# Patient Record
Sex: FEMALE | URBAN - METROPOLITAN AREA
[De-identification: names, ages, dates, MRNs, and addresses within clinical notes are randomized per-mention and may not be internally consistent; named-entity substitution may affect disease eponyms.]

---

## 2022-07-25 ENCOUNTER — TELEPHONE ENCOUNTER (OUTPATIENT)
Dept: URBAN - METROPOLITAN AREA CLINIC 78 | Facility: CLINIC | Age: 47
End: 2022-07-25

## 2022-07-28 ENCOUNTER — OFFICE VISIT (OUTPATIENT)
Dept: URBAN - METROPOLITAN AREA CLINIC 33 | Facility: CLINIC | Age: 47
End: 2022-07-28

## 2022-07-28 NOTE — HPI-TODAY'S VISIT:
Patient presents today for a consultation for colorectal cancer screening. Admits/denies having a colonoscopy or sigmoidoscopy before. Currently admits __ bowel movements ___. Stools are __. Patient denies/admits heartburn. Admits/denies a family history of Colon/rectal cancer or polyps

## 2022-08-11 ENCOUNTER — OFFICE VISIT (OUTPATIENT)
Dept: URBAN - METROPOLITAN AREA CLINIC 33 | Facility: CLINIC | Age: 47
End: 2022-08-11
Payer: COMMERCIAL

## 2022-08-11 ENCOUNTER — LAB OUTSIDE AN ENCOUNTER (OUTPATIENT)
Dept: URBAN - METROPOLITAN AREA CLINIC 33 | Facility: CLINIC | Age: 47
End: 2022-08-11

## 2022-08-11 VITALS
SYSTOLIC BLOOD PRESSURE: 158 MMHG | HEIGHT: 61 IN | DIASTOLIC BLOOD PRESSURE: 96 MMHG | OXYGEN SATURATION: 97 % | HEART RATE: 77 BPM | BODY MASS INDEX: 44.37 KG/M2 | WEIGHT: 235 LBS

## 2022-08-11 DIAGNOSIS — Z12.11 COLON CANCER SCREENING: ICD-10-CM

## 2022-08-11 PROCEDURE — 99202 OFFICE O/P NEW SF 15 MIN: CPT | Performed by: INTERNAL MEDICINE

## 2022-08-11 PROCEDURE — 99242 OFF/OP CONSLTJ NEW/EST SF 20: CPT | Performed by: INTERNAL MEDICINE

## 2022-08-11 RX ORDER — SODIUM, POTASSIUM,MAG SULFATES 17.5-3.13G
177 ML SOLUTION, RECONSTITUTED, ORAL ORAL BID
Qty: 354 ML | Refills: 0 | OUTPATIENT
Start: 2022-08-11 | End: 2022-08-12

## 2022-08-11 NOTE — HPI-TODAY'S VISIT:
Patient presents today for a consultation for colorectal cancer screening. Denies having a previous colonoscopy or sigmoidoscopy. Denies a family history of Colon, gastric, or esophageal cancer/polyps.   Currently, admits 2 bowel movements a day without strain. Stools are semi firm without blood, mucus, or melena. She denies rectal pain or pruritus ani. Patient admits heartburn after consuming acidic or sweeter foods.   Patient admits that prior to taking Trulicity she was experiencing as episodes of diarrhea. Stools consistency could be described as loose. However, she admits Trulicity has helped improve the bowel movements overall.   Patient admits heartburn and acid reflux specifically after consuming acidic or sweeter foods. She admits taking OTC Prilosec PRN with relief.   No abdominal pain  No weight loss No CP, SOB, or fever No blood thinners No Sleep Apnea No Cardiac or Pulmonary issues.

## 2022-10-10 ENCOUNTER — OFFICE VISIT (OUTPATIENT)
Dept: URBAN - METROPOLITAN AREA SURGERY CENTER 8 | Facility: SURGERY CENTER | Age: 47
End: 2022-10-10

## 2022-10-13 ENCOUNTER — CLAIMS CREATED FROM THE CLAIM WINDOW (OUTPATIENT)
Dept: URBAN - METROPOLITAN AREA CLINIC 4 | Facility: CLINIC | Age: 47
End: 2022-10-13
Payer: COMMERCIAL

## 2022-10-13 ENCOUNTER — OFFICE VISIT (OUTPATIENT)
Dept: URBAN - METROPOLITAN AREA SURGERY CENTER 8 | Facility: SURGERY CENTER | Age: 47
End: 2022-10-13
Payer: COMMERCIAL

## 2022-10-13 DIAGNOSIS — K63.89 BACTERIAL OVERGROWTH SYNDROME: ICD-10-CM

## 2022-10-13 DIAGNOSIS — Z12.11 COLON CANCER SCREENING: ICD-10-CM

## 2022-10-13 DIAGNOSIS — K63.89 OTHER SPECIFIED DISEASES OF INTESTINE: ICD-10-CM

## 2022-10-13 DIAGNOSIS — D12.3 ADENOMA OF TRANSVERSE COLON: ICD-10-CM

## 2022-10-13 PROCEDURE — 45380 COLONOSCOPY AND BIOPSY: CPT | Performed by: INTERNAL MEDICINE

## 2022-10-13 PROCEDURE — G8907 PT DOC NO EVENTS ON DISCHARG: HCPCS | Performed by: INTERNAL MEDICINE

## 2022-10-13 PROCEDURE — 88305 TISSUE EXAM BY PATHOLOGIST: CPT | Performed by: PATHOLOGY

## 2022-10-27 ENCOUNTER — OFFICE VISIT (OUTPATIENT)
Dept: URBAN - METROPOLITAN AREA CLINIC 33 | Facility: CLINIC | Age: 47
End: 2022-10-27

## 2022-12-01 ENCOUNTER — DASHBOARD ENCOUNTERS (OUTPATIENT)
Age: 47
End: 2022-12-01

## 2022-12-01 ENCOUNTER — OFFICE VISIT (OUTPATIENT)
Dept: URBAN - METROPOLITAN AREA CLINIC 33 | Facility: CLINIC | Age: 47
End: 2022-12-01
Payer: COMMERCIAL

## 2022-12-01 VITALS — BODY MASS INDEX: 43.23 KG/M2 | WEIGHT: 229 LBS | OXYGEN SATURATION: 97 % | HEART RATE: 85 BPM | HEIGHT: 61 IN

## 2022-12-01 DIAGNOSIS — D12.3 ADENOMATOUS POLYP OF TRANSVERSE COLON: ICD-10-CM

## 2022-12-01 DIAGNOSIS — K57.30 DIVERTICULOSIS OF SIGMOID COLON: ICD-10-CM

## 2022-12-01 DIAGNOSIS — K64.8 INTERNAL HEMORRHOIDS: ICD-10-CM

## 2022-12-01 PROBLEM — 429430001: Status: ACTIVE | Noted: 2022-12-01

## 2022-12-01 PROCEDURE — 99213 OFFICE O/P EST LOW 20 MIN: CPT | Performed by: INTERNAL MEDICINE

## 2022-12-01 NOTE — HPI-TODAY'S VISIT:
47-Year-old female patient presents today for follow up to her colonoscopy. Patient currently admits 2 bowel movements a day with out strain. Stools vary between semi firm and loose. Patient denies heartburn. Denies a family history of Colon/rectal cancer or polyps.   Impression: -The examined portion of the ileum was normal. - Four 2 to 3 mm polyps in the distal transverse colon (2), in the distal ascending colon and in the cecum, removed with a cold biopsy forceps.  Resected and retrieved. - Diverticulosis in the sigmoid colon. - Non-bleeding internal hemorrhoids.  Pathology: (A) Cecum, Polypectomy: PROMINENT MUCOSAL LYMPHOID AGGREGATES.  Negative for Dysplasia or Malignancy. (B) Colon, Ascending, distal, Polypectomy: HYPERPLASTIC POLYP(S). (C) Colon, Transverse, distal, Polypectomy: TUBULAR ADENOMA(S).